# Patient Record
Sex: MALE | Race: WHITE | NOT HISPANIC OR LATINO | Employment: UNEMPLOYED | URBAN - METROPOLITAN AREA
[De-identification: names, ages, dates, MRNs, and addresses within clinical notes are randomized per-mention and may not be internally consistent; named-entity substitution may affect disease eponyms.]

---

## 2019-06-21 ENCOUNTER — TELEPHONE (OUTPATIENT)
Dept: GASTROENTEROLOGY | Facility: CLINIC | Age: 74
End: 2019-06-21

## 2019-08-13 ENCOUNTER — CONSULT (OUTPATIENT)
Dept: GASTROENTEROLOGY | Facility: MEDICAL CENTER | Age: 74
End: 2019-08-13
Payer: OTHER GOVERNMENT

## 2019-08-13 VITALS
SYSTOLIC BLOOD PRESSURE: 130 MMHG | HEART RATE: 64 BPM | HEIGHT: 70 IN | DIASTOLIC BLOOD PRESSURE: 78 MMHG | WEIGHT: 192 LBS | BODY MASS INDEX: 27.49 KG/M2 | TEMPERATURE: 98.7 F

## 2019-08-13 DIAGNOSIS — R13.10 DYSPHAGIA, UNSPECIFIED TYPE: Primary | ICD-10-CM

## 2019-08-13 PROCEDURE — 99204 OFFICE O/P NEW MOD 45 MIN: CPT | Performed by: INTERNAL MEDICINE

## 2019-08-13 RX ORDER — LISINOPRIL 5 MG/1
1 TABLET ORAL DAILY
COMMUNITY
Start: 2013-05-15

## 2019-08-13 RX ORDER — ACETAMINOPHEN AND CODEINE PHOSPHATE 300; 30 MG/1; MG/1
TABLET ORAL
Refills: 0 | COMMUNITY
Start: 2019-06-17

## 2019-08-13 RX ORDER — SIMVASTATIN 80 MG
0.5 TABLET ORAL DAILY
COMMUNITY
Start: 2013-02-21

## 2019-08-13 RX ORDER — MELOXICAM 15 MG/1
15 TABLET ORAL DAILY
COMMUNITY
Start: 2019-03-15

## 2019-08-13 RX ORDER — INSULIN GLARGINE 100 [IU]/ML
50 INJECTION, SOLUTION SUBCUTANEOUS DAILY
COMMUNITY
Start: 2017-08-16

## 2019-08-13 RX ORDER — GABAPENTIN 300 MG/1
300 CAPSULE ORAL
COMMUNITY

## 2019-08-13 RX ORDER — OMEPRAZOLE 40 MG/1
40 CAPSULE, DELAYED RELEASE ORAL 2 TIMES DAILY
COMMUNITY
Start: 2018-11-02

## 2019-08-13 RX ORDER — PRIMIDONE 50 MG/1
1 TABLET ORAL DAILY
COMMUNITY
Start: 2013-11-29

## 2019-08-13 RX ORDER — FLUTICASONE PROPIONATE 50 MCG
SPRAY, SUSPENSION (ML) NASAL DAILY
COMMUNITY
Start: 2014-12-12

## 2019-08-13 NOTE — PROGRESS NOTES
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  Currently doing well without dysphagia  Prior EGD, path, labs reviewed and discussed with the patient  No diagnosis found  No orders of the defined types were placed in this encounter  -- Repeat EGD in 1 year  -- f/u with dermatology    ______________________________________________________________________    HPI:    Madeleine Fabian is a 75 y o  male who presents with complaint of dysphagia    He went to [place] recently to see Dr Byron Plaza  He underwent an EGD  The lab lost the report  He underwent a repeat EGD and did not get a report back (11/2/2018)  The second EGD was 1/25/2019  He was having difficulty swallowing and he felt like his throat was closed  He would eat and have to vomit or bring up the food almost every time he ate  Dr Mccall Port stretched it and now his symptoms now better, with only rare symptoms  He notes that he does not always chew his food well  He is chewing better and taking his time  He did not see a dermatologist      No reflux  No abdominal pain, nausea, vomiting, diarrhea    + constipation but he does not take anything for it  1-2 BMs per week  Had incontinence when he could not hold it in long enough to make it to the bathroom 3 months ago  No BRBPR or black stools  Last colonoscopy a few years ago and he does not think he is due  Path from prior EGD: The pathology demonstrated: Squamous epithelium with lichenoid inflammation, parakeratosis, and dyskeratosis, negative for dysplasia;  consistent with lichenoid esophagitis      1  Esophagus, 20 cm, biopsy:  - Squamous epithelium with lichenoid inflammation, parakeratosis, and   dyskeratosis, negative for  dysplasia;  consistent with lichenoid esophagitis,   see note  2  Esophagus, 25 cm, biopsy:  - Squamous mucosa with lichenoid inflammation, parakeratosis, dyskeratosis, and   lamina propria  fibrosis, negative for dysplasia;    consistent with lichenoid esophagitis,     see note  3  Esophagus, 35 cm, biopsy:  - Squamous mucosa with lichenoid inflammation, parakeratosis, dyskeratosis, and   lamina propria  fibrosis, negative for dysplasia;    consistent with lichenoid esophagitis,     see note  4  Esophagus, plaques from 35-40 cm, biopsy:  - Squamous epithelium with lichenoid inflammation, parakeratosis, dyskeratosis,   and lamina  propria fibrosis, negative for dysplasia;     consistent with lichenoid   esophagitis,   see note  EGD 9/2018: In middle third of esophagus white plaques, thickened in lower third of esopahgus and stiff and firable  Had to switch to thin scope  Stomach and duodenum normal    Path:  Portions of squamous mucosa with atypical parakeratosis cannot exclude dysplasia  Few eosinophils   PA stain for fungus is negative    EGD 9/1/2016:  White plaques in esophagus, fiable and stricturing of EGJ without biopsy  Pediatric scope used  REVIEW OF SYSTEMS:  10 point ROS reviewed and negative, except as above      Historical Information   Past Medical History:   Diagnosis Date    Diabetes mellitus ([facility] )     Hypertension      Past Surgical History:   Procedure Laterality Date    APPENDECTOMY      BACK SURGERY       Social History   Social History     Substance and Sexual Activity   Alcohol Use Not Currently     Social History     Substance and Sexual Activity   Drug Use Never     Social History     Tobacco Use   Smoking Status Never Smoker   Smokeless Tobacco Never Used     History reviewed  No pertinent family history      Meds/Allergies       Current Outpatient Medications:     acetaminophen-codeine (TYLENOL #3) 300-30 mg per tablet    aspirin 81 MG tablet    cyanocobalamin 1000 MCG tablet   fluticasone (FLONASE) 50 mcg/act nasal spray    gabapentin (NEURONTIN) 300 mg capsule    glucose blood (ADVOCATE REDI-CODE+ TEST) test strip    insulin glargine (LANTUS) 100 units/mL subcutaneous injection    Lancets 30G MISC    lisinopril (ZESTRIL) 5 mg tablet    meloxicam (MOBIC) 15 mg tablet    metoprolol tartrate (LOPRESSOR) 25 mg tablet    omeprazole (PriLOSEC) 40 MG capsule    primidone (MYSOLINE) 50 mg tablet    simvastatin (ZOCOR) 80 mg tablet    Allergies   Allergen Reactions    Tramadol Other (See Comments)     confusion    Lidocaine     Benzocaine-Triclosan Rash           Objective     Blood pressure 130/78, pulse 64, temperature 98 7 °F (37 1 °C), height 5' 10" (1 778 m), weight 87 1 kg (192 lb)  Body mass index is 27 55 kg/m²  PHYSICAL EXAM:      General Appearance:   Alert, cooperative, no distress   HEENT:   Normocephalic, atraumatic, anicteric  Neck:  Supple, symmetrical, trachea midline   Lungs:   Clear to auscultation bilaterally; no rales, rhonchi or wheezing; respirations unlabored    Heart[de-identified]   Regular rate and rhythm; no murmur, rub, or gallop  Abdomen:   Soft, non-tender, non-distended; normal bowel sounds; no masses, no organomegaly    Genitalia:   Deferred    Rectal:   Deferred    Extremities:  No cyanosis, clubbing or edema    Pulses:  2+ and symmetric    Skin:  No jaundice, rashes, or lesions    Lymph nodes:  No palpable cervical lymphadenopathy        Lab Results:   No visits with results within 1 Day(s) from this visit  Latest known visit with results is:   No results found for any previous visit  Radiology Results:   No results found